# Patient Record
Sex: FEMALE | Race: BLACK OR AFRICAN AMERICAN
[De-identification: names, ages, dates, MRNs, and addresses within clinical notes are randomized per-mention and may not be internally consistent; named-entity substitution may affect disease eponyms.]

---

## 2018-03-10 ENCOUNTER — HOSPITAL ENCOUNTER (INPATIENT)
Dept: HOSPITAL 17 - NEPE | Age: 58
LOS: 3 days | Discharge: HOME | DRG: 558 | End: 2018-03-13
Attending: HOSPITALIST | Admitting: HOSPITALIST
Payer: SELF-PAY

## 2018-03-10 VITALS
DIASTOLIC BLOOD PRESSURE: 72 MMHG | HEART RATE: 84 BPM | SYSTOLIC BLOOD PRESSURE: 131 MMHG | OXYGEN SATURATION: 99 % | TEMPERATURE: 98.1 F

## 2018-03-10 VITALS
HEART RATE: 82 BPM | RESPIRATION RATE: 16 BRPM | SYSTOLIC BLOOD PRESSURE: 138 MMHG | DIASTOLIC BLOOD PRESSURE: 82 MMHG | OXYGEN SATURATION: 98 % | TEMPERATURE: 97.7 F

## 2018-03-10 VITALS
TEMPERATURE: 98 F | SYSTOLIC BLOOD PRESSURE: 117 MMHG | OXYGEN SATURATION: 95 % | RESPIRATION RATE: 16 BRPM | DIASTOLIC BLOOD PRESSURE: 75 MMHG | HEART RATE: 76 BPM

## 2018-03-10 VITALS — HEIGHT: 64 IN | WEIGHT: 171.96 LBS | BODY MASS INDEX: 29.36 KG/M2

## 2018-03-10 VITALS — SYSTOLIC BLOOD PRESSURE: 138 MMHG | DIASTOLIC BLOOD PRESSURE: 78 MMHG

## 2018-03-10 DIAGNOSIS — M16.0: ICD-10-CM
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
## 2018-03-10 DIAGNOSIS — E78.5: ICD-10-CM

## 2018-03-10 DIAGNOSIS — R74.0: ICD-10-CM

## 2018-03-10 DIAGNOSIS — R74.8: ICD-10-CM

## 2018-03-10 DIAGNOSIS — M62.82: Primary | ICD-10-CM

## 2018-03-10 DIAGNOSIS — G89.29: ICD-10-CM

## 2018-03-10 DIAGNOSIS — E11.9: ICD-10-CM

## 2018-03-10 DIAGNOSIS — M54.5: ICD-10-CM

## 2018-03-10 DIAGNOSIS — M62.81: ICD-10-CM

## 2018-03-10 DIAGNOSIS — E55.9: ICD-10-CM

## 2018-03-10 DIAGNOSIS — I10: ICD-10-CM

## 2018-03-10 LAB
ALBUMIN SERPL-MCNC: 3.5 GM/DL (ref 3.4–5)
ALP SERPL-CCNC: 71 U/L (ref 45–117)
ALT SERPL-CCNC: 129 U/L (ref 10–53)
AST SERPL-CCNC: 151 U/L (ref 15–37)
BACTERIA #/AREA URNS HPF: (no result) /HPF
BASOPHILS # BLD AUTO: 0 TH/MM3 (ref 0–0.2)
BASOPHILS NFR BLD: 0.4 % (ref 0–2)
BILIRUB SERPL-MCNC: 0.4 MG/DL (ref 0.2–1)
BUN SERPL-MCNC: 7 MG/DL (ref 7–18)
CALCIUM SERPL-MCNC: 8.9 MG/DL (ref 8.5–10.1)
CHLORIDE SERPL-SCNC: 108 MEQ/L (ref 98–107)
COLOR UR: YELLOW
CREAT SERPL-MCNC: 0.45 MG/DL (ref 0.5–1)
CRP SERPL-MCNC: 0.29 MG/DL (ref 0–0.3)
EOSINOPHIL # BLD: 0 TH/MM3 (ref 0–0.4)
EOSINOPHIL NFR BLD: 0.7 % (ref 0–4)
ERYTHROCYTE [DISTWIDTH] IN BLOOD BY AUTOMATED COUNT: 16.2 % (ref 11.6–17.2)
GFR SERPLBLD BASED ON 1.73 SQ M-ARVRAT: 174 ML/MIN (ref 89–?)
GLUCOSE SERPL-MCNC: 117 MG/DL (ref 74–106)
GLUCOSE UR STRIP-MCNC: (no result) MG/DL
HCO3 BLD-SCNC: 26.6 MEQ/L (ref 21–32)
HCT VFR BLD CALC: 37.5 % (ref 35–46)
HGB BLD-MCNC: 12.5 GM/DL (ref 11.6–15.3)
HGB UR QL STRIP: (no result)
KETONES UR STRIP-MCNC: (no result) MG/DL
LYMPHOCYTES # BLD AUTO: 2 TH/MM3 (ref 1–4.8)
LYMPHOCYTES NFR BLD AUTO: 49.6 % (ref 9–44)
MCH RBC QN AUTO: 31.6 PG (ref 27–34)
MCHC RBC AUTO-ENTMCNC: 33.2 % (ref 32–36)
MCV RBC AUTO: 95.2 FL (ref 80–100)
MONOCYTE #: 0.2 TH/MM3 (ref 0–0.9)
MONOCYTES NFR BLD: 5.5 % (ref 0–8)
MUCOUS THREADS #/AREA URNS LPF: (no result) /LPF
NEUTROPHILS # BLD AUTO: 1.8 TH/MM3 (ref 1.8–7.7)
NEUTROPHILS NFR BLD AUTO: 43.8 % (ref 16–70)
NITRITE UR QL STRIP: (no result)
PLATELET # BLD: 305 TH/MM3 (ref 150–450)
PMV BLD AUTO: 7.6 FL (ref 7–11)
PROT SERPL-MCNC: 7.3 GM/DL (ref 6.4–8.2)
RBC # BLD AUTO: 3.94 MIL/MM3 (ref 4–5.3)
SODIUM SERPL-SCNC: 144 MEQ/L (ref 136–145)
SP GR UR STRIP: 1.02 (ref 1–1.03)
SQUAMOUS #/AREA URNS HPF: 2 /HPF (ref 0–5)
URINE LEUKOCYTE ESTERASE: (no result)
VIT B12 SERPL-MCNC: 326 PG/ML (ref 193–986)
WBC # BLD AUTO: 4.1 TH/MM3 (ref 4–11)

## 2018-03-10 PROCEDURE — 85652 RBC SED RATE AUTOMATED: CPT

## 2018-03-10 PROCEDURE — 80048 BASIC METABOLIC PNL TOTAL CA: CPT

## 2018-03-10 PROCEDURE — 82607 VITAMIN B-12: CPT

## 2018-03-10 PROCEDURE — 72148 MRI LUMBAR SPINE W/O DYE: CPT

## 2018-03-10 PROCEDURE — 84443 ASSAY THYROID STIM HORMONE: CPT

## 2018-03-10 PROCEDURE — 85025 COMPLETE CBC W/AUTO DIFF WBC: CPT

## 2018-03-10 PROCEDURE — 72146 MRI CHEST SPINE W/O DYE: CPT

## 2018-03-10 PROCEDURE — 82306 VITAMIN D 25 HYDROXY: CPT

## 2018-03-10 PROCEDURE — 82550 ASSAY OF CK (CPK): CPT

## 2018-03-10 PROCEDURE — 73130 X-RAY EXAM OF HAND: CPT

## 2018-03-10 PROCEDURE — 82552 ASSAY OF CPK IN BLOOD: CPT

## 2018-03-10 PROCEDURE — 81001 URINALYSIS AUTO W/SCOPE: CPT

## 2018-03-10 PROCEDURE — 73502 X-RAY EXAM HIP UNI 2-3 VIEWS: CPT

## 2018-03-10 PROCEDURE — 86140 C-REACTIVE PROTEIN: CPT

## 2018-03-10 PROCEDURE — 82948 REAGENT STRIP/BLOOD GLUCOSE: CPT

## 2018-03-10 PROCEDURE — 80053 COMPREHEN METABOLIC PANEL: CPT

## 2018-03-10 PROCEDURE — 83735 ASSAY OF MAGNESIUM: CPT

## 2018-03-10 RX ADMIN — HEPARIN SODIUM SCH UNITS: 10000 INJECTION, SOLUTION INTRAVENOUS; SUBCUTANEOUS at 13:34

## 2018-03-10 RX ADMIN — INSULIN ASPART SCH: 100 INJECTION, SOLUTION INTRAVENOUS; SUBCUTANEOUS at 17:00

## 2018-03-10 RX ADMIN — STANDARDIZED SENNA CONCENTRATE AND DOCUSATE SODIUM SCH TAB: 8.6; 5 TABLET, FILM COATED ORAL at 21:10

## 2018-03-10 RX ADMIN — INSULIN ASPART SCH: 100 INJECTION, SOLUTION INTRAVENOUS; SUBCUTANEOUS at 21:00

## 2018-03-10 RX ADMIN — HEPARIN SODIUM SCH MLS/HR: 10000 INJECTION, SOLUTION INTRAVENOUS; SUBCUTANEOUS at 15:23

## 2018-03-10 RX ADMIN — FOLIC ACID SCH MLS/HR: 5 INJECTION, SOLUTION INTRAMUSCULAR; INTRAVENOUS; SUBCUTANEOUS at 21:10

## 2018-03-10 RX ADMIN — Medication SCH ML: at 21:10

## 2018-03-10 NOTE — RADRPT
EXAM DATE/TIME:  03/10/2018 04:58 

 

[facility] COMPARISON:     

No previous studies available for comparison.

 

                     

INDICATIONS :     

Fall. Left hip pain.

                     

 

MEDICAL HISTORY :     

None.          

 

SURGICAL HISTORY :     

None.   

 

ENCOUNTER:     

Initial                                        

 

ACUITY:     

1 day      

 

PAIN SCORE:     

7/10

 

LOCATION:     

Left pelvis 

 

FINDINGS:     

The bony pelvis is intact and has normal morphology. No subluxation or fracture of either hip. There 
is mild bilateral hip osteoarthritis. Mild bilateral subchondral sclerosis. There is subchondral cyst
ic change of the left acetabulum. Radiographic appearance of the soft tissues within normal limits.

 

Moderate degenerative changes of the pubic symphysis.

 

CONCLUSION:     

1. Intact pelvis and left hip.

2. Mild bilateral hip osteoarthritis.

3. Moderate osteoarthritis of the pubic symphysis.

 

 

 

 Andrzej Lowe MD on March 10, 2018 at 5:15           

Board Certified Radiologist.

 This report was verified electronically.

## 2018-03-10 NOTE — HHI.HP
__________________________________________________





HPI


Service


[facility]


Primary Care Physician


Chaz Oh D.O.


Admission Diagnosis





rhabdomyolysis, rule out myositis


Diagnoses:  


Chief Complaint:  


back pain, weakness


Travel History


International Travel<30 Days:  No


Contact w/Intl Traveler <30 Da:  No


Traveled to Known Affected Are:  No


History of Present Illness


Written by Katiuska Harrison, acting as scribe for Dr. Bethea on 3/10/18 at 14:

35. 





58-year-old female with HTN, HLD, DM, presents with 1 year history of worsening 

low back pain, leg weakness, and recent fall. The patient reports over the past 

year she's had low back and hip pains, with loss of strength of the bilateral 

legs. 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

, however lately has not been able to keep up with the work. A month ago she 

picked up a tissue off the floor, and had difficulty standing back up. She saw 

her PCP Dr. Oh as outpatient at that time, had xrays done, and was told she 

has osteoarthritis in bilateral hips. Over the past month she has been having 

trouble getting in and out of a car. She states she has to  her legs 

with her arms in order to transition in and out of the car. The patient states 

last night she went out to dinner with her fiance, had a few cocktails, then 

had a step up however her legs were unable to do so and they just gave out on 

her. She states she fell to ground onto her left hip and left hand. She reports 

continued diffuse low back pain, but mostly complains of bilateral leg 

weakness. She also reports noticing recent numbness down the left lateral leg. 

Denies any recent weight loss or specific joint pains.





Review of Systems


Except as stated in HPI:  all other systems reviewed are Neg





Past Family Social History


Past Medical History


Hyperlipidemia


Hypertension


Diabetes Mellitus


Past Surgical History


Laparoscopic Cholecystectomy





Reported Medications


Unknown, RN to update med rec


Allergies:  


Coded Allergies:  


     No Known Allergies (Unverified , 3/10/18)


Active Ordered Medications





Current Medications








 Medications


  (Trade)  Dose


 Ordered  Sig/Virgilio


 Route  Start Time


 Stop Time Status Last Admin


 


  (D50w (Vial) Inj)  50 ml  UNSCH  PRN


 IV PUSH  3/10/18 13:00


     


 


 


  (Glucagon Inj)  1 mg  UNSCH  PRN


 OTHER  3/10/18 13:00


     


 


 


  (NovoLOG


 SUPPLEMENTAL


 SCALE)  1  ACHS SLIDING  SCALE


 SQ  3/10/18 17:00


     


 


 


 Sodium


 Bicarbonate 100


 meq/Potassium


 Chloride 20 meq/


 Sodium Chloride  1,110 ml @ 


 100 mls/hr  Q11H6M


 IV  3/10/18 14:00


     


 


 


  (NS Flush)  2 ml  UNSCH  PRN


 IV FLUSH  3/10/18 13:00


     


 


 


  (NS Flush)  2 ml  BID


 IV FLUSH  3/10/18 21:00


     


 


 


  (Zofran Inj)  4 mg  Q6H  PRN


 IVP  3/10/18 13:00


     


 


 


  (Heparin Inj)  5,000 units  Q12H


 SQ  3/10/18 13:00


    3/10/18 13:34


 


 


  (Morphine Inj)  1 mg  Q3H  PRN


 IV PUSH  3/10/18 13:00


     


 


 


  (Ultram)  50 mg  Q4H  PRN


 PO  3/10/18 13:00


     


 


 


  (Ultram)  100 mg  Q4H  PRN


 PO  3/10/18 13:00


     


 


 


  (Narcan Inj)  0.4 mg  UNSCH  PRN


 IV PUSH  3/10/18 13:00


     


 


 


  (Jolie-Colace)  1 tab  BID


 PO  3/10/18 21:00


     


 


 


  (Milk Of


 Magnesia Liq)  30 ml  Q12H  PRN


 PO  3/10/18 13:00


     


 


 


  (Senokot)  17.2 mg  Q12H  PRN


 PO  3/10/18 13:00


     


 


 


  (Dulcolax Supp)  10 mg  DAILY  PRN


 RECTAL  3/10/18 13:00


     


 


 


  (Lactulose Liq)  30 ml  DAILY  PRN


 PO  3/10/18 13:00


     


 








Family History


mother with COPD and diabetes


Social History


Drinks alcohol, 1 beer daily, and occasional few cocktails. 


Denies tobacco use or illicit drug use.





Physical Exam


Vital Signs





Vital Signs








  Date Time  Temp Pulse Resp B/P (MAP) Pulse Ox O2 Delivery O2 Flow Rate FiO2


 


3/10/18 14:18        


 


3/10/18 12:54   18     


 


3/10/18 12:09    138/78 (98)    


 


3/10/18 07:14  83 18  99 Room Air  


 


3/10/18 01:51 98.1 84  131/72 (91) 99   








Physical Exam


GENERAL: Well-nourished, well-developed middle aged female patient in NAD.


SKIN: Warm and dry. No rash.


HEAD:  Normocephalic. Atraumatic.


EYES: Pupils equal and round. No scleral icterus. No injection or drainage. 


ENT: No nasal bleeding or discharge.  Mucous membranes pink and moist.


NECK: Supple. Trachea midline.  


CARDIOVASCULAR: Regular rate and rhythm.  S1, S2 noted. No murmur appreciated. 


RESPIRATORY: No accessory muscle use. Clear to auscultation. Breath sounds 

equal bilaterally.  


GASTROINTESTINAL: Abdomen soft, non-tender, nondistended. Normoactive bowel 

sounds x4.


MUSCULOSKELETAL: No obvious deformities. Extremities without clubbing, cyanosis

, or edema. C-T-L spine nontender to palpation, no bony point or paraspinous 

muscle tenderness. 


NEUROLOGICAL: Awake and alert. No obvious cranial nerve deficits.  Motor 

grossly within normal limits. 5/5 muscle strength in bilateral upper and lower 

extremities, however with generalized weakness, difficulty sitting herself up 

in bed.  Normal speech.


PSYCHIATRIC: Appropriate mood and affect; insight and judgment normal.


Laboratory





Laboratory Tests








Test


  3/10/18


08:55 3/10/18


09:00


 


White Blood Count 4.1  


 


Red Blood Count 3.94  


 


Hemoglobin 12.5  


 


Hematocrit 37.5  


 


Mean Corpuscular Volume 95.2  


 


Mean Corpuscular Hemoglobin 31.6  


 


Mean Corpuscular Hemoglobin


Concent 33.2 


  


 


 


Red Cell Distribution Width 16.2  


 


Platelet Count 305  


 


Mean Platelet Volume 7.6  


 


Neutrophils (%) (Auto) 43.8  


 


Lymphocytes (%) (Auto) 49.6  


 


Monocytes (%) (Auto) 5.5  


 


Eosinophils (%) (Auto) 0.7  


 


Basophils (%) (Auto) 0.4  


 


Neutrophils # (Auto) 1.8  


 


Lymphocytes # (Auto) 2.0  


 


Monocytes # (Auto) 0.2  


 


Eosinophils # (Auto) 0.0  


 


Basophils # (Auto) 0.0  


 


CBC Comment DIFF FINAL  


 


Differential Comment   


 


Blood Urea Nitrogen 7  


 


Creatinine 0.45  


 


Random Glucose 117  


 


Total Protein 7.3  


 


Albumin 3.5  


 


Calcium Level 8.9  


 


Alkaline Phosphatase 71  


 


Aspartate Amino Transf


(AST/SGOT) 151 


  


 


 


Alanine Aminotransferase


(ALT/SGPT) 129 


  


 


 


Total Bilirubin 0.4  


 


Sodium Level 144  


 


Potassium Level 3.5  


 


Chloride Level 108  


 


Carbon Dioxide Level 26.6  


 


Anion Gap 9  


 


Estimat Glomerular Filtration


Rate 174 


  


 


 


Total Creatine Kinase 92541  


 


Creatine Kinase .2  


 


Creatine Kinase MB % 3.6  


 


Urine Color  YELLOW 


 


Urine Turbidity  CLEAR 


 


Urine pH  5.5 


 


Urine Specific Gravity  1.021 


 


Urine Protein  NEG 


 


Urine Glucose (UA)  NEG 


 


Urine Ketones  NEG 


 


Urine Occult Blood  NEG 


 


Urine Nitrite  NEG 


 


Urine Bilirubin  NEG 


 


Urine Urobilinogen  LESS THAN 2.0 


 


Urine Leukocyte Esterase  NEG 


 


Urine RBC  LESS THAN 1 


 


Urine WBC  2 


 


Urine Squamous Epithelial


Cells 


  2 


 


 


Urine Bacteria  OCC 


 


Urine Mucus  MOD 


 


Microscopic Urinalysis Comment


  


  CULT NOT


INDICATED








Result Diagram:  


3/10/18 0855                                                                   

             3/10/18 0855





Imaging





Last Impressions








Thoracic Spine MRI 3/10/18 0524 Signed





Impressions: 





 Service Date/Time:  Saturday, March 10, 2018 06:18 - CONCLUSION:  1. Mild 





 degenerative disease T6-T7 2. No evidence of significant disc protrusion, 

spinal 





 stenosis, bony abnormality or spinal cord abnormalities.     Paul Valdivia MD 


 


Lumbar Spine MRI 3/10/18 0524 Signed





Impressions: 





 Service Date/Time:  Saturday, March 10, 2018 06:18 - CONCLUSION:  Normal 





 examination.       Paul Valdivia MD 


 


Hip and Pelvis X-Ray 3/10/18 0440 Signed





Impressions: 





 Service Date/Time:  Saturday, March 10, 2018 04:58 - CONCLUSION:  1. Intact 





 pelvis and left hip. 2. Mild bilateral hip osteoarthritis. 3. Moderate 





 osteoarthritis of the pubic symphysis.     Andrzej Lowe MD 


 


Hand X-Ray 3/10/18 0440 Signed





Impressions: 





 Service Date/Time:  Saturday, March 10, 2018 05:01 - CONCLUSION: Intact left 





 hand.     Andrzej Lowe MD 











Capcolbyi VTE Risk Assessment


Caprini VTE Risk Assessment:  No/Low Risk (score <= 1)


Caprini Risk Assessment Model











 Point Value = 1          Point Value = 2  Point Value = 3  Point Value = 5


 


Age 41-60


Minor surgery


BMI > 25 kg/m2


Swollen legs


Varicose veins


Pregnancy or postpartum


History of unexplained or recurrent


   spontaneous 


Oral contraceptives or hormone


   replacement


Sepsis (< 1 month)


Serious lung disease, including


   pneumonia (< 1 month)


Abnormal pulmonary function


Acute myocardial infarction


Congestive heart failure (< 1 month)


History of inflammatory bowel disease


Medical patient at bed rest Age 61-74


Arthroscopic surgery


Major open surgery (> 45 min)


Laparoscopic surgery (> 45 min)


Malignancy


Confined to bed (> 72 hours)


Immobilizing plaster cast


Central venous access Age >= 75


History of VTE


Family history of VTE


Factor V Leiden


Prothrombin 33443M


Lupus anticoagulant


Anticardiolipin antibodies


Elevated serum homocysteine


Heparin-induced thrombocytopenia


Other congenital or acquired


   thrombophilia Stroke (< 1 month)


Elective arthroplasty


Hip, pelvis, or leg fracture


Acute spinal cord injury (< 1 month)








Prophylaxis Regimen











   Total Risk


Factor Score Risk Level Prophylaxis Regimen


 


0-1      Low Early ambulation


 


2 Moderate Order ONE of the following:


*Sequential Compression Device (SCD)


*Heparin 5000 units SQ BID


 


3-4 Higher Order ONE of the following medications:


*Heparin 5000 units SQ TID


*Enoxaparin/Lovenox 40 mg SQ daily (WT < 150 kg, CrCl > 30 mL/min)


*Enoxaparin/Lovenox 30 mg SQ daily (WT < 150 kg, CrCl > 10-29 mL/min)


*Enoxaparin/Lovenox 30 mg SQ BID (WT < 150 kg, CrCl > 30 mL/min)


AND/OR


*Sequential Compression Device (SCD)


 


5 or more Highest Order ONE of the following medications:


*Heparin 5000 units SQ TID (Preferred with Epidurals)


*Enoxaparin/Lovenox 40 mg SQ daily (WT < 150 kg, CrCl > 30 mL/min)


*Enoxaparin/Lovenox 30 mg SQ daily (WT < 150 kg, CrCl > 10-29 mL/min)


*Enoxaparin/Lovenox 30 mg SQ BID (WT < 150 kg, CrCl > 30 mL/min)


AND


*Sequential Compression Device (SCD)











Assessment and Plan


Problem List:  


(1) Rhabdomyolysis


ICD Code:  M62.82 - Rhabdomyolysis


Status:  Acute


(2) Fall


ICD Code:  W19.XXXA - Unspecified fall, initial encounter


Status:  Acute


Assessment and Plan


58-year-old female with HTN, HLD, DM, presents with 1 year history of worsening 

low back pain, leg weakness, and recent fall. 





Bilateral proximal lower Extremity Weakness and Fall: unclear etiology, rule 

out myositis.  


   -T-spine MRI reviewed, shows mild degenerative disease T6-7; no other acute 

abnormalities


   -L-spine MRI reviewed, normal


   -Hip/Pelvis xray reviewed, show mild bilateral osteoarthritis and moderate 

osteoarthritis of pubic symphysis


   -Check ESR, CRP, TSH, B12, Vitamin D.  Consider neurology consult


   -Consult PT


   -Fall precautions





Rhabdomyolysis: CPK 22187. Suspect secondary to recent fall


   -Continue IVF with NS at 100cc/hr


   -Monitor CPK and renal function





Transaminitis: patient with elevated LFTs, no previous labs to compare. Suspect 

secondary to patient's daily alcohol use and rhabdomyolysis. 


   -avoid hepatotoxins


   -repeat LFTs


   -counseled on alcohol cessation


   -outpatient f/up





Hyperlipidemia/Hypertension: BP currently well controlled.


   -unclear if patient takes meds at home, RN to update med rec





Diabetes Mellitus: 


   -Monitor accu-checks and cover with SSI





DVT Prophylaxis: Heparin sq


Discussed Condition With


Patient, RN, Dr. Crockett





This note was transcribed by staci Harrison.  I, Dr. Tom Bethea 

personally performed the history, physical exam, and medical decision making; 

and confirmed the accuracy of the information in the transcribed note.





Authenticated by Dr. Tom Bethea on 3/10/18 at 14:45.





Problem Qualifiers





(1) Rhabdomyolysis:  


Qualified Codes:  T79.6XXA - Traumatic ischemia of muscle, initial encounter


(2) Fall:  


Qualified Codes:  W19.XXXA - Unspecified fall, initial encounter








Katiuska Harrison PA-C Mar 10, 2018 14:37


Tom Bethea MD Mar 10, 2018 14:45

## 2018-03-10 NOTE — RADRPT
EXAM DATE/TIME:  03/10/2018 06:18 

 

[facility] COMPARISON:     

No previous studies available for comparison.

       

 

 

INDICATIONS :     

Weakness. Back pain. Left sided weakness.

                     

 

MEDICAL HISTORY :     

Diabetes mellitus type 2.     

 

SURGICAL HISTORY :     

Cholecystectomy.     

 

ENCOUNTER:     

Initial

 

ACUITY:     

> 1 year

 

PAIN SCORE:     

8/10

 

LOCATION:       

Paraspinal 

 

TECHNIQUE:     

Multiplanar multisequence MRI of the thoracic spine was performed.

 

FINDINGS:     

 

VERTEBRA:     

Normal vertebral body height.  Homogeneous marrow signal.

 

ALIGNMENT:     

Normal.

 

CORD:     

Normal position and configuration.

 

T1-T2:     

Normal.

 

T2-T3:     

The thecal sac has a normal diameter.  No evidence of disc bulge or protrusion.

 

T3-T4:     

The thecal sac has a normal diameter.  No evidence of disc bulge or protrusion.

 

T4-T5:     

The thecal sac has a normal diameter.  No evidence of disc bulge or protrusion.

 

T5-T6:     

The thecal sac has a normal diameter.  No evidence of disc bulge or protrusion.

 

T6-T7:     

Mild degenerative disc disease. Small left paracentral disc osteophyte complex without significant ma
ss effect is noted.

 

T7-T8:     

The thecal sac has a normal diameter.  No evidence of disc bulge or protrusion.

 

T8-T9:     

The thecal sac has a normal diameter.  No evidence of disc bulge or protrusion.

 

T9-T10:   

The thecal sac has a normal diameter.  No evidence of disc bulge or protrusion.

 

T10-T11: 

The thecal sac has a normal diameter.  No evidence of disc bulge or protrusion.

 

T11-T12: 

The thecal sac has a normal diameter.  No evidence of disc bulge or protrusion.

 

T12-L1:   

The thecal sac has a normal diameter.  No evidence of disc bulge or protrusion.

 

CONCLUSION:     

1. Mild degenerative disease T6-T7

2. No evidence of significant disc protrusion, spinal stenosis, bony abnormality or spinal cord abnor
malities.

 

 

 

 Paul Valdivia MD on March 10, 2018 at 7:14           

Board Certified Radiologist.

 This report was verified electronically.

## 2018-03-10 NOTE — PD
HPI


Chief Complaint:  Fall


Time Seen by Provider:  04:40


Travel History


International Travel<30 days:  No


Contact w/Intl Traveler<30days:  No


Traveled to known affect area:  No





History of Present Illness


HPI


58-year-old female patient presents to the ER today, states that she has had a 

1 year history of lower back pains, told that she has arthritis in her hips, 

and states that she has difficulty getting up when she bends down to get 

something.  She states that she bent down today to  something on the 

floor and she fell onto her left hip and hand, complaining of left hip and hand 

pain.  She denies any incontinence, fevers, or other issues.  She states that 

it has been getting so bad that she needs help to get in and out of cars, and 

to get up when she bends down.  She denies any head injury or other injuries.  

She states that she felt like her legs gave out on her, states that she has 

been getting weaker and weaker.





Modifying Factors: None


Associated Signs & Symptoms: Acute on chronic back pain, legs gave out, left 

hip injury and have head injury


Risk Factors: Chronic back pain





PFSH


Past Medical History


High Cholesterol:  Yes


Diabetes:  Yes


Patient Takes Glucophage:  Yes


Diminished Hearing:  No


Hypertension:  Yes





Social History


Alcohol Use:  Yes (WEEKLY )


Tobacco Use:  No


Substance Use:  No





Allergies-Medications


(Allergen,Severity, Reaction):  


Coded Allergies:  


     No Known Allergies (Unverified , 3/10/18)





Review of Systems


Except as stated in HPI:  all other systems reviewed are Neg





Physical Exam


Narrative


GENERAL: Well-developed middle-aged -American female patient currently 

in mild distress.  Awake and oriented 3.


SKIN: Focused skin assessment warm/dry.


HEAD: Atraumatic. Normocephalic. 


EYES: Pupils equal and round. No scleral icterus. No injection or drainage. 


ENT: No nasal bleeding or discharge.  Mucous membranes pink and moist.


NECK: Trachea midline. No JVD.  Supple.


CARDIOVASCULAR: Regular rate and rhythm.  No murmur appreciated.


RESPIRATORY: No accessory muscle use. Clear to auscultation. Breath sounds 

equal bilaterally. 


GASTROINTESTINAL: Abdomen soft, non-tender, nondistended. Hepatic and splenic 

margins not palpable. 


Pelvis: Stable, mildly tender to palpation in the left hip area.


MUSCULOSKELETAL: No obvious deformities. No clubbing.  No cyanosis.  No edema. 


EXTREMITIES: No clubbing, cyanosis, or edema. No joint tenderness, effusion, or 

edema noted.  Mildly tender to palpation of the left hand dorsum without 

obvious contusion or lacerations.  No bony tenderness or deformities..


NEUROLOGICAL: Awake and alert. No obvious cranial nerve deficits.  Motor 

grossly within normal limits. Normal speech.


PSYCHIATRIC: Appropriate mood and affect; insight and judgment normal.





Data


Data


Last Documented VS





Vital Signs








  Date Time  Temp Pulse Resp B/P (MAP) Pulse Ox O2 Delivery O2 Flow Rate FiO2


 


3/10/18 01:51 98.1 84  131/72 (91) 99   








Orders





 Orders


Hand, Complete (Sud8xlw) (3/10/18 04:40)


Hip, Uni(Ap&Lat) W Ap Pelvis (3/10/18 04:40)


Mri T Spine W/O Contrast (3/10/18 05:24)


Mri L Spine W/O Contrast (3/10/18 05:24)


Tramadol (Ultram) (3/10/18 05:30)








MDM


Medical Decision Making


Medical Screen Exam Complete:  Yes


Emergency Medical Condition:  Yes


Medical Record Reviewed:  Yes


Interpretation(s)





Last 24 hours Impressions








Hip and Pelvis X-Ray 3/10/18 0440 Signed





Impressions: 





 Service Date/Time:  Saturday, March 10, 2018 04:58 - CONCLUSION:  1. Intact 





 pelvis and left hip. 2. Mild bilateral hip osteoarthritis. 3. Moderate 





 osteoarthritis of the pubic symphysis.     Andrzej Lowe MD 


 


Hand X-Ray 3/10/18 0440 Signed





Impressions: 





 Service Date/Time:  Saturday, March 10, 2018 05:01 - CONCLUSION: Intact left 





 hand.     Andrzej Lowe MD 








Differential Diagnosis


Contusions versus fractures


Narrative Course


X-rays not show any signs of acute injuries.  MRI was ordered due to her 

weakness.





Physician Communication


Physician Communication


Case is signed out to Dr. Crockett at 7 AM pending MRI.  If unremarkable for 

acute issues, can be released with follow-up to primary care doctor.





Diagnosis





 Primary Impression:  


 Fall


 Additional Impressions:  


 Injury of left hip


 Acute exacerbation of chronic low back pain


Condition:  Stable











Roscoe Quinonez MD Mar 10, 2018 05:04

## 2018-03-10 NOTE — RADRPT
EXAM DATE/TIME:  03/10/2018 06:18 

 

[facility] COMPARISON:     

No previous studies available for comparison.

       

 

 

INDICATIONS :     

Weakness. Back pain. Left sided weakness.

                     

 

MEDICAL HISTORY :     

Diabetes mellitus type 2.     

 

SURGICAL HISTORY :     

Cholecystectomy.     

 

ENCOUNTER:     

Initial

 

ACUITY:     

> 1 year

 

PAIN SCORE:     

8/10

 

LOCATION:       

Paraspinal 

 

TECHNIQUE:     

Multiplanar multisequence MRI of the lumbar spine was performed without contrast.

 

FINDINGS:     

The most caudal appearing lumbar vertebra is numbered as L5.

 

VERTEBRAE:     

Homogeneous signal.  Normal alignment.

 

CONUS:     

Normal level and configuration.

 

T12-L1:  

The thecal sac has a normal diameter.  No evidence of disc bulge or protrusion.  The neural foramina 
are patent bilaterally.

 

L1-L2:  

The thecal sac has a normal diameter.  No evidence of disc bulge or protrusion.  The neural foramina 
are patent bilaterally.

 

L2-L3:  

The thecal sac has a normal diameter.  No evidence of disc bulge or protrusion.  The neural foramina 
are patent bilaterally.

 

L3-L4:  

The thecal sac has a normal diameter.  No evidence of disc bulge or protrusion.  The neural foramina 
are patent bilaterally.

 

L4-L5:  

The thecal sac has a normal diameter.  No evidence of disc bulge or protrusion.  The neural foramina 
are patent bilaterally.

 

L5-S1:  

The thecal sac has a normal diameter.  No evidence of disc bulge or protrusion.  The neural foramina 
are patent bilaterally.

 

CONCLUSION:     

Normal examination.  

 

 

 

 Paul Valdivia MD on March 10, 2018 at 7:18           

Board Certified Radiologist.

 This report was verified electronically.

## 2018-03-10 NOTE — RADRPT
EXAM DATE/TIME:  03/10/2018 05:01 

 

[facility] COMPARISON:     

No previous studies available for comparison.

 

                     

INDICATIONS :     

Fall. Left hand pain.

                     

 

MEDICAL HISTORY :     

None.          

 

SURGICAL HISTORY :     

None.   

 

ENCOUNTER:     

Initial                                        

 

ACUITY:     

1 day      

 

PAIN SCORE:     

6/10

 

LOCATION:     

Left upper extremity 

 

FINDINGS:     

Three view examination of the left hand demonstrates no soft tissue swelling, dislocation, or fractur
e.   The carpal bones appear intact.  The interphalangeal and metacarpophalangeal joints are intact. 
 Bony mineralization is normal.

 

CONCLUSION:     Intact left hand.

 

 

 

 Andrzej Lowe MD on March 10, 2018 at 5:17           

Board Certified Radiologist.

 This report was verified electronically.

## 2018-03-10 NOTE — PD
Physical Exam


Date Seen by Provider:  Mar 10, 2018


Time Seen by Provider:  07:06


Narrative


The patient is a 58 year-old female who was initially evaluated by the previous 

physician.  Please refer to the initial history, physical, diagnostic evaluation

, treatment modality plan.  The patient was signed out at 7 AM with MRI of the T

-spine and L-spine pending.





Data


Data


Last Documented VS





Vital Signs








  Date Time  Temp Pulse Resp B/P (MAP) Pulse Ox O2 Delivery O2 Flow Rate FiO2


 


3/10/18 12:54   18     


 


3/10/18 12:09    138/78 (98)    


 


3/10/18 07:14  83   99 Room Air  


 


3/10/18 01:51 98.1       








Orders





 Orders


Hand, Complete (Cnq0hat) (3/10/18 04:40)


Hip, Uni(Ap&Lat) W Ap Pelvis (3/10/18 04:40)


Mri T Spine W/O Contrast (3/10/18 05:24)


Mri L Spine W/O Contrast (3/10/18 05:24)


Tramadol (Ultram) (3/10/18 05:30)


Complete Blood Count With Diff (3/10/18 07:05)


Comprehensive Metabolic Panel (3/10/18 07:05)


Urinalysis - C+S If Indicated (3/10/18 07:05)


Creatine Kinase (Cpk) (3/10/18 07:05)


CKMB (3/10/18 08:55)


CKMB% (3/10/18 08:55)


Sodium Chlor 0.9% 1000 Ml Inj (Ns 1000 M (3/10/18 13:00)





Labs





Laboratory Tests








Test


  3/10/18


08:55 3/10/18


09:00


 


White Blood Count 4.1 TH/MM3  


 


Red Blood Count 3.94 MIL/MM3  


 


Hemoglobin 12.5 GM/DL  


 


Hematocrit 37.5 %  


 


Mean Corpuscular Volume 95.2 FL  


 


Mean Corpuscular Hemoglobin 31.6 PG  


 


Mean Corpuscular Hemoglobin


Concent 33.2 % 


  


 


 


Red Cell Distribution Width 16.2 %  


 


Platelet Count 305 TH/MM3  


 


Mean Platelet Volume 7.6 FL  


 


Neutrophils (%) (Auto) 43.8 %  


 


Lymphocytes (%) (Auto) 49.6 %  


 


Monocytes (%) (Auto) 5.5 %  


 


Eosinophils (%) (Auto) 0.7 %  


 


Basophils (%) (Auto) 0.4 %  


 


Neutrophils # (Auto) 1.8 TH/MM3  


 


Lymphocytes # (Auto) 2.0 TH/MM3  


 


Monocytes # (Auto) 0.2 TH/MM3  


 


Eosinophils # (Auto) 0.0 TH/MM3  


 


Basophils # (Auto) 0.0 TH/MM3  


 


CBC Comment DIFF FINAL  


 


Differential Comment   


 


Blood Urea Nitrogen 7 MG/DL  


 


Creatinine 0.45 MG/DL  


 


Random Glucose 117 MG/DL  


 


Total Protein 7.3 GM/DL  


 


Albumin 3.5 GM/DL  


 


Calcium Level 8.9 MG/DL  


 


Alkaline Phosphatase 71 U/L  


 


Aspartate Amino Transf


(AST/SGOT) 151 U/L 


  


 


 


Alanine Aminotransferase


(ALT/SGPT) 129 U/L 


  


 


 


Total Bilirubin 0.4 MG/DL  


 


Sodium Level 144 MEQ/L  


 


Potassium Level 3.5 MEQ/L  


 


Chloride Level 108 MEQ/L  


 


Carbon Dioxide Level 26.6 MEQ/L  


 


Anion Gap 9 MEQ/L  


 


Estimat Glomerular Filtration


Rate 174 ML/MIN 


  


 


 


Total Creatine Kinase 04943 U/L  


 


Creatine Kinase .2 NG/ML  


 


Creatine Kinase MB % 3.6 %  


 


Urine Color  YELLOW 


 


Urine Turbidity  CLEAR 


 


Urine pH  5.5 


 


Urine Specific Gravity  1.021 


 


Urine Protein  NEG mg/dL 


 


Urine Glucose (UA)  NEG mg/dL 


 


Urine Ketones  NEG mg/dL 


 


Urine Occult Blood  NEG 


 


Urine Nitrite  NEG 


 


Urine Bilirubin  NEG 


 


Urine Urobilinogen


  


  LESS THAN 2.0


MG/DL


 


Urine Leukocyte Esterase  NEG 


 


Urine RBC


  


  LESS THAN 1


/hpf


 


Urine WBC  2 /hpf 


 


Urine Squamous Epithelial


Cells 


  2 /hpf 


 


 


Urine Bacteria  OCC /hpf 


 


Urine Mucus  MOD /lpf 


 


Microscopic Urinalysis Comment


  


  CULT NOT


INDICATED











MDM


Medical Record Reviewed:  Yes


Supervised Visit with FAISAL:  No


Interpretation(s)





Laboratory Tests








Test


  3/10/18


08:55 3/10/18


09:00


 


White Blood Count 4.1 TH/MM3  


 


Red Blood Count 3.94 MIL/MM3  


 


Hemoglobin 12.5 GM/DL  


 


Hematocrit 37.5 %  


 


Mean Corpuscular Volume 95.2 FL  


 


Mean Corpuscular Hemoglobin 31.6 PG  


 


Mean Corpuscular Hemoglobin


Concent 33.2 % 


  


 


 


Red Cell Distribution Width 16.2 %  


 


Platelet Count 305 TH/MM3  


 


Mean Platelet Volume 7.6 FL  


 


Neutrophils (%) (Auto) 43.8 %  


 


Lymphocytes (%) (Auto) 49.6 %  


 


Monocytes (%) (Auto) 5.5 %  


 


Eosinophils (%) (Auto) 0.7 %  


 


Basophils (%) (Auto) 0.4 %  


 


Neutrophils # (Auto) 1.8 TH/MM3  


 


Lymphocytes # (Auto) 2.0 TH/MM3  


 


Monocytes # (Auto) 0.2 TH/MM3  


 


Eosinophils # (Auto) 0.0 TH/MM3  


 


Basophils # (Auto) 0.0 TH/MM3  


 


CBC Comment DIFF FINAL  


 


Differential Comment   


 


Blood Urea Nitrogen 7 MG/DL  


 


Creatinine 0.45 MG/DL  


 


Random Glucose 117 MG/DL  


 


Total Protein 7.3 GM/DL  


 


Albumin 3.5 GM/DL  


 


Calcium Level 8.9 MG/DL  


 


Alkaline Phosphatase 71 U/L  


 


Aspartate Amino Transf


(AST/SGOT) 151 U/L 


  


 


 


Alanine Aminotransferase


(ALT/SGPT) 129 U/L 


  


 


 


Total Bilirubin 0.4 MG/DL  


 


Sodium Level 144 MEQ/L  


 


Potassium Level 3.5 MEQ/L  


 


Chloride Level 108 MEQ/L  


 


Carbon Dioxide Level 26.6 MEQ/L  


 


Anion Gap 9 MEQ/L  


 


Estimat Glomerular Filtration


Rate 174 ML/MIN 


  


 


 


Total Creatine Kinase 18561 U/L  


 


Creatine Kinase .2 NG/ML  


 


Creatine Kinase MB % 3.6 %  


 


Urine Color  YELLOW 


 


Urine Turbidity  CLEAR 


 


Urine pH  5.5 


 


Urine Specific Gravity  1.021 


 


Urine Protein  NEG mg/dL 


 


Urine Glucose (UA)  NEG mg/dL 


 


Urine Ketones  NEG mg/dL 


 


Urine Occult Blood  NEG 


 


Urine Nitrite  NEG 


 


Urine Bilirubin  NEG 


 


Urine Urobilinogen


  


  LESS THAN 2.0


MG/DL


 


Urine Leukocyte Esterase  NEG 


 


Urine RBC


  


  LESS THAN 1


/hpf


 


Urine WBC  2 /hpf 


 


Urine Squamous Epithelial


Cells 


  2 /hpf 


 


 


Urine Bacteria  OCC /hpf 


 


Urine Mucus  MOD /lpf 


 


Microscopic Urinalysis Comment


  


  CULT NOT


INDICATED








Last Impressions








Thoracic Spine MRI 3/10/18 0524 Signed





Impressions: 





 Service Date/Time:  Saturday, March 10, 2018 06:18 - CONCLUSION:  1. Mild 





 degenerative disease T6-T7 2. No evidence of significant disc protrusion, 

spinal 





 stenosis, bony abnormality or spinal cord abnormalities.     Paul Valdivia MD 


 


Lumbar Spine MRI 3/10/18 0524 Signed





Impressions: 





 Service Date/Time:  Saturday, March 10, 2018 06:18 - CONCLUSION:  Normal 





 examination.       Paul Valdivia MD 


 


Hip and Pelvis X-Ray 3/10/18 0440 Signed





Impressions: 





 Service Date/Time:  Saturday, March 10, 2018 04:58 - CONCLUSION:  1. Intact 





 pelvis and left hip. 2. Mild bilateral hip osteoarthritis. 3. Moderate 





 osteoarthritis of the pubic symphysis.     Andrzej Lowe MD 


 


Hand X-Ray 3/10/18 0440 Signed





Impressions: 





 Service Date/Time:  Saturday, March 10, 2018 05:01 - CONCLUSION: Intact left 





 hand.     Andrzej Lowe MD 








Differential Diagnosis


Differential diagnosis includes cauda equina, herniated disc, chronic back pain

, disability, UTI, hyponatremia, acute renal failure.


Narrative Course


The patient is a 58 year-old female was initially evaluated by the previous 

physician.  Please refer to the initial history, physical, diagnostic evaluation

, treatment modality plan.  The patient was signed out at 7 AM with MRI of the 

lumbar spine and thoracic spine pending.  The patient's x-rays and MRIs were 

reviewed, no significant changes except for degenerative changes.  However, she 

has pain and weakness of the hands bilaterally which she attributed arthritis.  

However, she has been having increasing symptoms.  Therefore, laboratory 

evaluation was performed, the patient had an elevated CPK of greater than 11,

000.  Unsure if this is related to polymyositis/myositis versus polymyalgia 

rheumatica with her weakness.  Patient may need sedimentation rate and CRP 

obtained as well as rheumatology evaluation.  Patient was administered 1 L of 

IV fluids.  The patient will be admitted.


Physician Communication


Physician Communication


I discussed the patient with Dr. Bethea who agrees with admission.





Diagnosis





 Primary Impression:  


 Fall


 Qualified Codes:  W19.XXXA - Unspecified fall, initial encounter


 Additional Impressions:  


 Injury of left hip


 Qualified Codes:  S79.912A - Unspecified injury of left hip, initial encounter


 Acute exacerbation of chronic low back pain


 Rhabdomyolysis


 Qualified Codes:  T79.6XXA - Traumatic ischemia of muscle, initial encounter





Admitting Information


Admitting Physician Requests:  Admit


Condition:  Stable











Prosper Crockett MD Mar 10, 2018 07:07

## 2018-03-11 VITALS
HEART RATE: 74 BPM | SYSTOLIC BLOOD PRESSURE: 118 MMHG | TEMPERATURE: 97.5 F | DIASTOLIC BLOOD PRESSURE: 71 MMHG | OXYGEN SATURATION: 97 % | RESPIRATION RATE: 16 BRPM

## 2018-03-11 VITALS
TEMPERATURE: 98 F | OXYGEN SATURATION: 97 % | RESPIRATION RATE: 20 BRPM | DIASTOLIC BLOOD PRESSURE: 77 MMHG | HEART RATE: 78 BPM | SYSTOLIC BLOOD PRESSURE: 112 MMHG

## 2018-03-11 VITALS
RESPIRATION RATE: 18 BRPM | OXYGEN SATURATION: 99 % | TEMPERATURE: 99.2 F | HEART RATE: 86 BPM | SYSTOLIC BLOOD PRESSURE: 113 MMHG | DIASTOLIC BLOOD PRESSURE: 67 MMHG

## 2018-03-11 VITALS
SYSTOLIC BLOOD PRESSURE: 119 MMHG | TEMPERATURE: 96.8 F | DIASTOLIC BLOOD PRESSURE: 79 MMHG | OXYGEN SATURATION: 95 % | HEART RATE: 73 BPM | RESPIRATION RATE: 18 BRPM

## 2018-03-11 VITALS
SYSTOLIC BLOOD PRESSURE: 123 MMHG | TEMPERATURE: 98.6 F | HEART RATE: 86 BPM | RESPIRATION RATE: 20 BRPM | OXYGEN SATURATION: 100 % | DIASTOLIC BLOOD PRESSURE: 76 MMHG

## 2018-03-11 VITALS
TEMPERATURE: 98 F | DIASTOLIC BLOOD PRESSURE: 70 MMHG | RESPIRATION RATE: 20 BRPM | OXYGEN SATURATION: 97 % | HEART RATE: 75 BPM | SYSTOLIC BLOOD PRESSURE: 110 MMHG

## 2018-03-11 VITALS — HEART RATE: 75 BPM

## 2018-03-11 VITALS — OXYGEN SATURATION: 95 %

## 2018-03-11 LAB
ALBUMIN SERPL-MCNC: 2.9 GM/DL (ref 3.4–5)
ALP SERPL-CCNC: 59 U/L (ref 45–117)
ALT SERPL-CCNC: 100 U/L (ref 10–53)
AST SERPL-CCNC: 122 U/L (ref 15–37)
BILIRUB SERPL-MCNC: 0.4 MG/DL (ref 0.2–1)
BUN SERPL-MCNC: 6 MG/DL (ref 7–18)
CALCIUM SERPL-MCNC: 8.3 MG/DL (ref 8.5–10.1)
CHLORIDE SERPL-SCNC: 106 MEQ/L (ref 98–107)
CREAT SERPL-MCNC: 0.33 MG/DL (ref 0.5–1)
GFR SERPLBLD BASED ON 1.73 SQ M-ARVRAT: 249 ML/MIN (ref 89–?)
GLUCOSE SERPL-MCNC: 112 MG/DL (ref 74–106)
HCO3 BLD-SCNC: 27.6 MEQ/L (ref 21–32)
PROT SERPL-MCNC: 6.1 GM/DL (ref 6.4–8.2)
SODIUM SERPL-SCNC: 142 MEQ/L (ref 136–145)

## 2018-03-11 RX ADMIN — VITAMIN D, TAB 1000IU (100/BT) SCH UNITS: 25 TAB at 09:24

## 2018-03-11 RX ADMIN — INSULIN ASPART SCH: 100 INJECTION, SOLUTION INTRAVENOUS; SUBCUTANEOUS at 08:00

## 2018-03-11 RX ADMIN — STANDARDIZED SENNA CONCENTRATE AND DOCUSATE SODIUM SCH TAB: 8.6; 5 TABLET, FILM COATED ORAL at 09:25

## 2018-03-11 RX ADMIN — MULTIPLE VITAMINS W/ MINERALS TAB SCH TAB: TAB at 09:24

## 2018-03-11 RX ADMIN — INSULIN ASPART SCH: 100 INJECTION, SOLUTION INTRAVENOUS; SUBCUTANEOUS at 12:00

## 2018-03-11 RX ADMIN — INSULIN ASPART SCH: 100 INJECTION, SOLUTION INTRAVENOUS; SUBCUTANEOUS at 17:11

## 2018-03-11 RX ADMIN — FOLIC ACID SCH MLS/HR: 5 INJECTION, SOLUTION INTRAMUSCULAR; INTRAVENOUS; SUBCUTANEOUS at 10:00

## 2018-03-11 RX ADMIN — STANDARDIZED SENNA CONCENTRATE AND DOCUSATE SODIUM SCH TAB: 8.6; 5 TABLET, FILM COATED ORAL at 21:00

## 2018-03-11 RX ADMIN — FOLIC ACID SCH MLS/HR: 5 INJECTION, SOLUTION INTRAMUSCULAR; INTRAVENOUS; SUBCUTANEOUS at 12:39

## 2018-03-11 RX ADMIN — FOLIC ACID SCH MG: 1 TABLET ORAL at 09:24

## 2018-03-11 RX ADMIN — FOLIC ACID SCH MLS/HR: 5 INJECTION, SOLUTION INTRAMUSCULAR; INTRAVENOUS; SUBCUTANEOUS at 04:52

## 2018-03-11 RX ADMIN — HEPARIN SODIUM SCH UNITS: 10000 INJECTION, SOLUTION INTRAVENOUS; SUBCUTANEOUS at 00:58

## 2018-03-11 RX ADMIN — HEPARIN SODIUM SCH UNITS: 10000 INJECTION, SOLUTION INTRAVENOUS; SUBCUTANEOUS at 17:02

## 2018-03-11 RX ADMIN — Medication SCH ML: at 21:00

## 2018-03-11 RX ADMIN — INSULIN ASPART SCH: 100 INJECTION, SOLUTION INTRAVENOUS; SUBCUTANEOUS at 21:00

## 2018-03-11 RX ADMIN — HEPARIN SODIUM SCH MLS/HR: 10000 INJECTION, SOLUTION INTRAVENOUS; SUBCUTANEOUS at 12:38

## 2018-03-11 RX ADMIN — Medication SCH ML: at 09:00

## 2018-03-11 NOTE — HHI.DCPOC
Discharge Care Plan


Diagnosis:  


(1) Rhabdomyolysis


(2) Osteoarthritis of hips, bilateral


(3) Fall


Goals to Promote Your Health


* To prevent worsening of your condition and complications


* To maintain your health at the optimal level


Directions to Meet Your Goals


*** Take your medications as prescribed


*** Follow your dietary instruction


*** Follow activity as directed








*** Keep your appointments as scheduled


*** Take your immunizations and boosters as scheduled


*** If your symptoms worsen call your PCP, if no PCP go to Urgent Care Center 

or Emergency Room***


*** Smoking is Dangerous to Your Health. Avoid second hand smoke***


***Call the 24-hour hour crisis hotline for domestic abuse at 1-803.662.5575***











Katiuska Harrison PA-C Mar 11, 2018 14:46

## 2018-03-11 NOTE — HHI.PR
Subjective


Remarks


Follow-up rhabdomyolysis.  Patient doing better still weak in her proximal 

muscles needing help when she gets up discussed with nursing and physical 

therapy.





Objective


Vitals





Vital Signs








  Date Time  Temp Pulse Resp B/P (MAP) Pulse Ox O2 Delivery O2 Flow Rate FiO2


 


3/11/18 05:10 96.8 73 18 119/79 (92) 95   


 


3/11/18 01:11 97.5 74 16 118/71 (87) 97   


 


3/10/18 20:15 97.7 82 16 138/82 (100) 98   


 


3/10/18 15:45      Room Air  


 


3/10/18 14:55 98.0 76 16 117/75 (89) 95   


 


3/10/18 14:18        


 


3/10/18 12:54   18     


 


3/10/18 12:09    138/78 (98)    


 


3/10/18 07:14  83 18  99 Room Air  














I/O      


 


 3/10/18 3/10/18 3/10/18 3/11/18 3/11/18 3/11/18





 07:00 15:00 23:00 07:00 15:00 23:00


 


Intake Total   1000 ml   


 


Balance   1000 ml   


 


      


 


Intake IV Total   1000 ml   








Result Diagram:  


3/10/18 0855                                                                   

             3/10/18 0855





Imaging





Last Impressions








Thoracic Spine MRI 3/10/18 0524 Signed





Impressions: 





 Service Date/Time:  Saturday, March 10, 2018 06:18 - CONCLUSION:  1. Mild 





 degenerative disease T6-T7 2. No evidence of significant disc protrusion, 

spinal 





 stenosis, bony abnormality or spinal cord abnormalities.     Paul Valdivia MD 


 


Lumbar Spine MRI 3/10/18 0524 Signed





Impressions: 





 Service Date/Time:  Saturday, March 10, 2018 06:18 - CONCLUSION:  Normal 





 examination.       Paul Valdivia MD 


 


Hip and Pelvis X-Ray 3/10/18 0440 Signed





Impressions: 





 Service Date/Time:  Saturday, March 10, 2018 04:58 - CONCLUSION:  1. Intact 





 pelvis and left hip. 2. Mild bilateral hip osteoarthritis. 3. Moderate 





 osteoarthritis of the pubic symphysis.     Andrzej Lowe MD 


 


Hand X-Ray 3/10/18 0440 Signed





Impressions: 





 Service Date/Time:  Saturday, March 10, 2018 05:01 - CONCLUSION: Intact left 





 hand.     Andrzej Lowe MD 








Objective Remarks


GENERAL: Well-nourished, well-developed middle aged female patient in NAD.


SKIN: Warm and dry. No rash.


CARDIOVASCULAR: Regular rate and rhythm.  S1, S2 noted. No murmur appreciated. 


RESPIRATORY: No accessory muscle use. Clear to auscultation. Breath sounds 

equal bilaterally.  


GASTROINTESTINAL: Abdomen soft, non-tender, nondistended. Normoactive bowel 

sounds x4.


MUSCULOSKELETAL: No obvious deformities. Extremities without clubbing, cyanosis

, or edema. C-T-L spine nontender to palpation, no bony point or paraspinous 

muscle tenderness. 


NEUROLOGICAL: Awake and alert. No obvious cranial nerve deficits.  Motor 

grossly within normal limits. 5/5 muscle strength in bilateral upper and lower 

extremities, however with generalized weakness, difficulty sitting herself up 

in bed.  Normal speech.


PSYCHIATRIC: Appropriate mood and affect; insight and judgment normal.


Procedures


None





A/P


Problem List:  


(1) Rhabdomyolysis


ICD Code:  M62.82 - Rhabdomyolysis


Status:  Acute


(2) Fall


ICD Code:  W19.XXXA - Unspecified fall, initial encounter


Status:  Acute


Assessment and Plan


58-year-old female with HTN, HLD, DM, presents with 1 year history of worsening 

low back pain, leg weakness, and recent fall. 





Bilateral proximal lower Extremity Weakness and Fall: unclear etiology, rule 

out myositis.  


   -T-spine MRI reviewed, shows mild degenerative disease T6-7; no other acute 

abnormalities


   -L-spine MRI reviewed, normal


   -Hip/Pelvis xray reviewed, show mild bilateral osteoarthritis and moderate 

osteoarthritis of pubic symphysis


   -Unremarkable ESR, CRP, TSH, B12.  Consider neurology consult


   -Consult PT, recommended outpatient physical therapy


   -Fall precautions





Rhabdomyolysis: CPK 57843. Suspect secondary to recent fall.  This is improving 

but still significantly elevated almost 9000


   -Continue IVF with NS at 100cc/hr


   -Monitor CPK and renal function





Transaminitis: patient with elevated LFTs, no previous labs to compare. Suspect 

secondary to patient's daily alcohol use and rhabdomyolysis.  A symptomatic


   -avoid hepatotoxins


   -repeat LFTs


   -counseled on alcohol cessation


   -outpatient f/up





Hyperlipidemia/Hypertension: BP currently well controlled.


   -Hold Lipitor secondary to transaminitis.  Restart Norvasc





Diabetes Mellitus: 


   -Monitor accu-checks and cover with SSI.  Hold hypoglycemics for now





Vitamin D deficiency.  Start vitamin D 1000 IU daily repeat level in 3 months 





DVT Prophylaxis: Heparin sq


Discharge Planning


Possible discharge in the morning pending improvement of CPK levels





Problem Qualifiers





(1) Rhabdomyolysis:  


Qualified Codes:  T79.6XXA - Traumatic ischemia of muscle, initial encounter


(2) Fall:  


Qualified Codes:  W19.XXXA - Unspecified fall, initial encounter








Tom Bethea MD Mar 11, 2018 06:42

## 2018-03-12 VITALS
HEART RATE: 80 BPM | RESPIRATION RATE: 18 BRPM | SYSTOLIC BLOOD PRESSURE: 148 MMHG | OXYGEN SATURATION: 98 % | DIASTOLIC BLOOD PRESSURE: 68 MMHG | TEMPERATURE: 98.8 F

## 2018-03-12 VITALS
OXYGEN SATURATION: 95 % | TEMPERATURE: 97.9 F | DIASTOLIC BLOOD PRESSURE: 77 MMHG | SYSTOLIC BLOOD PRESSURE: 111 MMHG | RESPIRATION RATE: 20 BRPM | HEART RATE: 79 BPM

## 2018-03-12 VITALS
OXYGEN SATURATION: 97 % | RESPIRATION RATE: 16 BRPM | TEMPERATURE: 98.2 F | DIASTOLIC BLOOD PRESSURE: 69 MMHG | HEART RATE: 80 BPM | SYSTOLIC BLOOD PRESSURE: 116 MMHG

## 2018-03-12 VITALS
SYSTOLIC BLOOD PRESSURE: 107 MMHG | HEART RATE: 74 BPM | TEMPERATURE: 98.1 F | OXYGEN SATURATION: 97 % | DIASTOLIC BLOOD PRESSURE: 62 MMHG | RESPIRATION RATE: 12 BRPM

## 2018-03-12 VITALS
TEMPERATURE: 98.3 F | DIASTOLIC BLOOD PRESSURE: 74 MMHG | HEART RATE: 72 BPM | RESPIRATION RATE: 16 BRPM | SYSTOLIC BLOOD PRESSURE: 121 MMHG | OXYGEN SATURATION: 98 %

## 2018-03-12 VITALS — HEART RATE: 80 BPM

## 2018-03-12 LAB
BUN SERPL-MCNC: 5 MG/DL (ref 7–18)
CALCIUM SERPL-MCNC: 9.1 MG/DL (ref 8.5–10.1)
CHLORIDE SERPL-SCNC: 105 MEQ/L (ref 98–107)
CREAT SERPL-MCNC: 0.41 MG/DL (ref 0.5–1)
GFR SERPLBLD BASED ON 1.73 SQ M-ARVRAT: 193 ML/MIN (ref 89–?)
GLUCOSE SERPL-MCNC: 130 MG/DL (ref 74–106)
HCO3 BLD-SCNC: 28.8 MEQ/L (ref 21–32)
MAGNESIUM SERPL-MCNC: 1.7 MG/DL (ref 1.5–2.5)
SODIUM SERPL-SCNC: 141 MEQ/L (ref 136–145)

## 2018-03-12 RX ADMIN — Medication SCH ML: at 09:23

## 2018-03-12 RX ADMIN — INSULIN ASPART SCH: 100 INJECTION, SOLUTION INTRAVENOUS; SUBCUTANEOUS at 21:00

## 2018-03-12 RX ADMIN — STANDARDIZED SENNA CONCENTRATE AND DOCUSATE SODIUM SCH TAB: 8.6; 5 TABLET, FILM COATED ORAL at 09:00

## 2018-03-12 RX ADMIN — VITAMIN D, TAB 1000IU (100/BT) SCH UNITS: 25 TAB at 09:24

## 2018-03-12 RX ADMIN — INSULIN ASPART SCH: 100 INJECTION, SOLUTION INTRAVENOUS; SUBCUTANEOUS at 08:00

## 2018-03-12 RX ADMIN — STANDARDIZED SENNA CONCENTRATE AND DOCUSATE SODIUM SCH TAB: 8.6; 5 TABLET, FILM COATED ORAL at 09:24

## 2018-03-12 RX ADMIN — HEPARIN SODIUM SCH UNITS: 10000 INJECTION, SOLUTION INTRAVENOUS; SUBCUTANEOUS at 05:11

## 2018-03-12 RX ADMIN — FOLIC ACID SCH MG: 1 TABLET ORAL at 09:24

## 2018-03-12 RX ADMIN — HEPARIN SODIUM SCH MLS/HR: 10000 INJECTION, SOLUTION INTRAVENOUS; SUBCUTANEOUS at 10:24

## 2018-03-12 RX ADMIN — HEPARIN SODIUM SCH MLS/HR: 10000 INJECTION, SOLUTION INTRAVENOUS; SUBCUTANEOUS at 23:21

## 2018-03-12 RX ADMIN — FOLIC ACID SCH MLS/HR: 5 INJECTION, SOLUTION INTRAMUSCULAR; INTRAVENOUS; SUBCUTANEOUS at 05:11

## 2018-03-12 RX ADMIN — STANDARDIZED SENNA CONCENTRATE AND DOCUSATE SODIUM SCH TAB: 8.6; 5 TABLET, FILM COATED ORAL at 22:03

## 2018-03-12 RX ADMIN — HEPARIN SODIUM SCH UNITS: 10000 INJECTION, SOLUTION INTRAVENOUS; SUBCUTANEOUS at 14:45

## 2018-03-12 RX ADMIN — HEPARIN SODIUM SCH MLS/HR: 10000 INJECTION, SOLUTION INTRAVENOUS; SUBCUTANEOUS at 05:10

## 2018-03-12 RX ADMIN — Medication SCH ML: at 21:00

## 2018-03-12 RX ADMIN — MULTIPLE VITAMINS W/ MINERALS TAB SCH TAB: TAB at 09:24

## 2018-03-12 RX ADMIN — INSULIN ASPART SCH: 100 INJECTION, SOLUTION INTRAVENOUS; SUBCUTANEOUS at 17:00

## 2018-03-12 RX ADMIN — HEPARIN SODIUM SCH MLS/HR: 10000 INJECTION, SOLUTION INTRAVENOUS; SUBCUTANEOUS at 15:50

## 2018-03-12 RX ADMIN — INSULIN ASPART SCH: 100 INJECTION, SOLUTION INTRAVENOUS; SUBCUTANEOUS at 12:30

## 2018-03-12 RX ADMIN — FOLIC ACID SCH MLS/HR: 5 INJECTION, SOLUTION INTRAMUSCULAR; INTRAVENOUS; SUBCUTANEOUS at 14:47

## 2018-03-12 RX ADMIN — CYANOCOBALAMIN SCH MCG: 1000 INJECTION, SOLUTION INTRAMUSCULAR at 14:44

## 2018-03-12 NOTE — MB
cc:

Carol Denton MD

****

 

 

DATE OF CONSULT:

03/12/2018

 

YOB: 1960

 

REASON FOR CONSULTATION:

Proximal muscle weakness.

 

HISTORY OF PRESENT ILLNESS:

This is a 58-year-old woman with a history of hypertension, 

hyperlipidemia., diabetes, has been having some back pain and leg 

weakness for quite a few months now.   She works at a kitchen at a 

school and lifts heavy boxes and just contributed to having to lift 

older boxes at her age but she also had issues with trying to get up 

on a chair to get something from the closet.  She could put her leg up

on the chair but she could not lift herself over it.  She has trouble 

getting in and out of a vehicle, climbing steps.  She was yesterday at

[employer] with her fiance and she tried to go up one step, held on and 

she fell forward, could not get up.  Sometime ago at work she also had

an issues where she was on the floor and could not get up.  She was 

trying to pick something up off of the floor.

 

She is followed up by her primary care, Dr. Oh, and has done x-rays

and was told she has osteoarthritis of the hips.  Does have some 

chronic back pain but her MRI of her lumbar and thoracic spine does 

not confirm any spinal stenosis or cord compression.  She has no 

incontinence.

 

PAST MEDICAL HISTORY:

She has a history of -

1. Hyperlipidemia.

2.  Hypertension.

3.  Diabetes.

 

FAMILY HISTORY:

She is adopted but her birth mother is diabetic and has COPD.

 

ALLERGIES:

None reported.

 

HOME MEDICINES:

She is on a statin, Lipitor 20 mg.  She has been on this for a few 

years, she states.  Please refer to her med list.

 

SOCIAL HISTORY:

She admits to a few drinks of beer per day and occasional cocktails.  

Denies tobacco or illicit drugs.

 

PHYSICAL EXAMINATION:

VITAL SIGNS:   Temperature 97.9, pulse 79, respiratory rate 20, blood 

pressure 111/77.

NECK:   Supple.  No bruits.

HEART:  Regular.

NEUROLOGICAL EXAMINATION:  She is awake, alert, oriented and fluent.  

Pupils reactive.  Face symmetrical.  Tongue in midline.  Visual fields

are normal.

Motor:  Upper extremity:  She has fairly good strength, at least 4+/5-

proximally as well as distally.  Reflexes are 2+; they are not brisk. 

There is no Gutierrez sign.  Lower extremity strength:  She is at best 

4/5 proximally as well as distally.  DTRs are 1+.  Toes - withdraws

Sensory is really unremarkable.

Gait is withheld at this time.  She is sitting at the edge of the bed.

Cerebellar is normal.

 

LABORATORY DATA:

Her sed rate is 20.

CBC is really unremarkable.

Chemistries are reviewed.  CRP is 0.29.  Her sed rate was 20, as 

stated.

Vitamin D is low at 14.4.

TSH is 1330.

Albumin 2.9.

CPK on admission was 11,593, now is 7545.

, down to 100.   , down to 122.

GFR normal, glucose 130.

Urine unremarkable.

 

IMAGING:

As stated, lumbar unremarkable.

 

Thoracic:  Some mild degenerative changes T6, T7.

 

Hand x-ray:  Unremarkable.

 

Hip, pelvis x-ray:  Intact mild bilateral hip osteoarthritis and 

moderate osteoarthritis of the pubic symphysis.

 

IMPRESSION AND PLAN:

Muscle weakness.  I do not think this is a polymyalgia rheumatica.  

Her imaging was unremarkable.  Sed rate and CRP were really 

unremarkable.

 

Her B12 is a little low; I would supplement that and I would start her

on vitamin D.  Her vitamin D level is low.  I do not believe 1000 

units would be enough; I would put her on a higher dose and repeat it 

in one month.  Continue to hydrate her.

 

CPK  is trending down.  Avoid hepatotoxins.  I would stop her statin 

all together.  Stop alcohol.  Have physical therapy see her.  She will

need outpatient PT.  If her symptoms do not improve off the statin and

hydrating, then she would probably need as an outpatient a muscle 

biopsy and/or a nerve conduction study.   Continue current 

recommendations.

 

 

 

 

__________________________________

MD SHE Rojo/BERTO

D: 03/12/2018, 01:07 PM

T: 03/12/2018, 01:33 PM

Visit #: [id number]

Job #: [id number]

## 2018-03-12 NOTE — HHI.PR
Subjective


Remarks


in no acute distress.


still with some proximal muscle weakness.


no other complaints.





Objective


Vitals





Vital Signs








  Date Time  Temp Pulse Resp B/P (MAP) Pulse Ox O2 Delivery O2 Flow Rate FiO2


 


3/12/18 07:41 97.9 79 20 111/77 (88) 95   


 


3/12/18 03:19 98.1 74 12 107/62 (77) 97   


 


3/12/18 00:27 98.8 80 18 148/68 (94) 98   


 


3/11/18 20:30 99.2 86 18 113/67 (82) 99   


 


3/11/18 20:00     95   


 


3/11/18 15:00  75      


 


3/11/18 14:53 98.6 86 20 123/76 (92) 100   


 


3/11/18 11:18 98.0 78 20 112/77 (89) 97   














I/O      


 


 3/11/18 3/11/18 3/11/18 3/12/18 3/12/18 3/12/18





 07:00 15:00 23:00 07:00 15:00 23:00


 


Intake Total   2750 ml   


 


Balance   2750 ml   


 


      


 


Intake Oral   1500 ml   


 


IV Total   1250 ml   


 


# Voids   6   


 


# Bowel Movements   1   








Result Diagram:  


3/10/18 0855                                                                   

             3/12/18 0559





Imaging





Last Impressions








Thoracic Spine MRI 3/10/18 0524 Signed





Impressions: 





 Service Date/Time:  Saturday, March 10, 2018 06:18 - CONCLUSION:  1. Mild 





 degenerative disease T6-T7 2. No evidence of significant disc protrusion, 

spinal 





 stenosis, bony abnormality or spinal cord abnormalities.     Paul Valdivia MD 


 


Lumbar Spine MRI 3/10/18 0524 Signed





Impressions: 





 Service Date/Time:  Saturday, March 10, 2018 06:18 - CONCLUSION:  Normal 





 examination.       Paul Valdivia MD 


 


Hip and Pelvis X-Ray 3/10/18 0440 Signed





Impressions: 





 Service Date/Time:  Saturday, March 10, 2018 04:58 - CONCLUSION:  1. Intact 





 pelvis and left hip. 2. Mild bilateral hip osteoarthritis. 3. Moderate 





 osteoarthritis of the pubic symphysis.     Andrzej Lowe MD 


 


Hand X-Ray 3/10/18 1830 Signed





Impressions: 





 Service Date/Time:  Saturday, March 10, 2018 05:01 - CONCLUSION: Intact left 





 hand.     Andrzej Lowe MD 








Objective Remarks


GENERAL: This is a well-nourished, well-developed patient, in no apparent 

distress.


CARDIOVASCULAR: Regular rate and regular rhythm without murmurs, gallops, or 

rubs. 


RESPIRATORY: Clear to auscultation. Breath sounds equal bilaterally. No wheezes

, rales, or rhonchi.  


GASTROINTESTINAL: Abdomen soft, non-tender, nondistended. 


Normal, active bowel sounds


MUSCULOSKELETAL: Extremities without clubbing, cyanosis, or edema.


NEURO:  Alert & Oriented x4 to person, place, time, situation.  Moves all ext x4


Procedures


None


Medications and IVs





Inpatient Medications


Amlodipine Besylate (Norvasc) 2.5 mg DAILY PO  Last administered on 3/11/18at 09

:24;  Start 3/11/18 at 09:00


Bisacodyl (Dulcolax Supp) 10 mg DAILY  PRN RECTAL SEVERE CONSITIPATION;  Start 3

/10/18 at 13:00


Cholecalciferol (Vitamin D3) 1,000 units DAILY PO  Last administered on 3/11/

18at 09:24;  Start 3/11/18 at 09:00


Dextrose (D50w (Vial) Inj) 50 ml UNSCH  PRN IV PUSH HYPOGLYCEMIA-SEE COMMENTS;  

Start 3/10/18 at 13:00


Flumazenil (Romazicon Inj) 0.2 mg Q1M  PRN IV PUSH SEE LABEL COMMENTS;  Start 3/

10/18 at 16:30


Folic Acid (Folate) 1 mg DAILY PO  Last administered on 3/11/18at 09:24;  Start 

3/11/18 at 09:00;  Stop 3/16/18 at 08:59


Glucagon (Glucagon Inj) 1 mg UNSCH  PRN OTHER HYPOGLYCEMIA-SEE COMMENTS;  Start 

3/10/18 at 13:00


Haloperidol Lactate (Haldol Inj) 2 mg Q15M  PRN IM SEE LABEL COMMENTS;  Start 3/

10/18 at 16:30


Heparin Sodium (Porcine) (Heparin Inj) 5,000 units Q12H SQ  Last administered 

on 3/12/18at 05:11;  Start 3/10/18 at 13:00


Insulin Aspart (NovoLOG SUPPLEMENTAL SCALE) 1 ACHS SLIDING  SCALE SQ  Last 

administered on 3/11/18at 17:11;  Start 3/10/18 at 17:00


Lactulose (Lactulose Liq) 30 ml DAILY  PRN PO SEVERE CONSITIPATION;  Start 3/10/

18 at 13:00


Lorazepam (Ativan Inj) 2 mg Q15M  PRN IV PUSH CIWA > 20;  Start 3/10/18 at 16:30


Lorazepam (Ativan) 2 mg Q2H  PRN PO CIWA 11-14;  Start 3/10/18 at 16:30


Magnesium Hydroxide (Milk Of Magnesia Liq) 30 ml Q12H  PRN PO Mild constipation

;  Start 3/10/18 at 13:00


Morphine Sulfate (Morphine Inj) 1 mg Q3H  PRN IV PUSH BREAKTHROUGH PAIN;  Start 

3/10/18 at 13:00


Multivitamins/ Minerals Therapeutic (Theragran M Tab) 1 tab DAILY PO  Last 

administered on 3/11/18at 09:24;  Start 3/11/18 at 09:00;  Stop 3/16/18 at 08:59


Naloxone HCl (Narcan Inj) 0.4 mg UNSCH  PRN IV PUSH SEE LABEL COMMENTS;  Start 3

/10/18 at 13:00


Ondansetron HCl (Zofran Inj) 4 mg Q6H  PRN IVP NAUSEA OR VOMITING;  Start 3/10/

18 at 13:00


Potassium Chloride (KCl) 20 meq ONCE  ONCE PO  Last administered on 3/11/18at 08

:45;  Start 3/11/18 at 08:45;  Stop 3/11/18 at 08:52;  Status DC


Senna/Docusate Sodium (Jolie-Colace) 1 tab BID PO  Last administered on 3/11/

18at 09:25;  Start 3/10/18 at 21:00


Sennosides (Senokot) 17.2 mg Q12H  PRN PO Moderate constipation;  Start 3/10/18 

at 13:00


Sodium Bicarbonate 100 meq/Potassium Chloride 20 meq/ Sodium Chloride 1,110 ml 

@  100 mls/hr Q11H6M IV  Last administered on 3/12/18at 05:10;  Start 3/10/18 

at 14:00


Sodium Chloride (NS Flush) 2 ml BID IV FLUSH  Last administered on 3/11/18at 09:

00;  Start 3/10/18 at 21:00


Thiamine HCl (Vitamin B1) 100 mg DAILY PO ;  Start 3/17/18 at 09:00


Thiamine HCl 500 mg/Sodium Chloride 255 ml @  62.5 mls/hr Q24H IV ;  Start 3/13/

18 at 09:00;  Stop 3/17/18 at 08:59


Tramadol HCl (Ultram) 100 mg Q4H  PRN PO PAIN SCALE 6 TO 10;  Start 3/10/18 at 

13:00





A/P


Problem List:  


(1) Rhabdomyolysis


ICD Code:  M62.82 - Rhabdomyolysis


Status:  Acute


(2) Fall


ICD Code:  W19.XXXA - Unspecified fall, initial encounter


Status:  Acute


Assessment and Plan


Bilateral proximal lower Extremity Weakness and Fall: unclear etiology.


   -T-spine MRI reviewed, shows mild degenerative disease T6-7; no other acute 

abnormalities


   -L-spine MRI reviewed, normal


   -Hip/Pelvis xray reviewed, show mild bilateral osteoarthritis and moderate 

osteoarthritis of pubic symphysis


   -Unremarkable ESR, CRP, TSH, B12.  Consider neurology consult


   -Consult PT, recommended outpatient physical therapy


   -consult neurology


   -Fall precautions





Rhabdomyolysis: CPK 24128. Suspect secondary to recent fall.  This is improving 

but still significantly elevated .


   -Continue IVF with NS at 100cc/hr


   -Monitor CPK and renal function





Transaminitis: patient with elevated LFTs, no previous labs to compare. Suspect 

secondary to patient's daily alcohol use and rhabdomyolysis.  A symptomatic


   -avoid hepatotoxins


   -repeat LFTs


   -counseled on alcohol cessation


   -outpatient f/up





Hyperlipidemia/Hypertension: BP currently well controlled.


   -Hold Lipitor secondary to transaminitis.  Restart Norvasc





Diabetes Mellitus: 


   -Monitor accu-checks and cover with SSI.  Hold hypoglycemics for now





Vitamin D deficiency.  Start vitamin D 1000 IU daily repeat level in 3 months 





DVT Prophylaxis: Heparin sq


Discharge Planning


awaiting neurology evaluation and further improvement in  her CPK level.





Problem Qualifiers





(1) Rhabdomyolysis:  


Qualified Codes:  T79.6XXA - Traumatic ischemia of muscle, initial encounter


(2) Fall:  


Qualified Codes:  W19.XXXA - Unspecified fall, initial encounter








Jose Perez MD Mar 12, 2018 08:07

## 2018-03-13 VITALS
HEART RATE: 77 BPM | RESPIRATION RATE: 16 BRPM | TEMPERATURE: 98.1 F | DIASTOLIC BLOOD PRESSURE: 75 MMHG | SYSTOLIC BLOOD PRESSURE: 120 MMHG | OXYGEN SATURATION: 97 %

## 2018-03-13 VITALS
DIASTOLIC BLOOD PRESSURE: 63 MMHG | SYSTOLIC BLOOD PRESSURE: 120 MMHG | RESPIRATION RATE: 16 BRPM | TEMPERATURE: 98.3 F | OXYGEN SATURATION: 98 % | HEART RATE: 77 BPM

## 2018-03-13 VITALS
RESPIRATION RATE: 16 BRPM | TEMPERATURE: 98.1 F | SYSTOLIC BLOOD PRESSURE: 119 MMHG | OXYGEN SATURATION: 95 % | DIASTOLIC BLOOD PRESSURE: 70 MMHG | HEART RATE: 71 BPM

## 2018-03-13 VITALS — HEART RATE: 70 BPM

## 2018-03-13 VITALS — HEART RATE: 69 BPM

## 2018-03-13 LAB
BUN SERPL-MCNC: 6 MG/DL (ref 7–18)
CALCIUM SERPL-MCNC: 9.3 MG/DL (ref 8.5–10.1)
CHLORIDE SERPL-SCNC: 103 MEQ/L (ref 98–107)
CREAT SERPL-MCNC: 0.47 MG/DL (ref 0.5–1)
GFR SERPLBLD BASED ON 1.73 SQ M-ARVRAT: 165 ML/MIN (ref 89–?)
GLUCOSE SERPL-MCNC: 122 MG/DL (ref 74–106)
HCO3 BLD-SCNC: 30.1 MEQ/L (ref 21–32)
SODIUM SERPL-SCNC: 142 MEQ/L (ref 136–145)

## 2018-03-13 RX ADMIN — INSULIN ASPART SCH: 100 INJECTION, SOLUTION INTRAVENOUS; SUBCUTANEOUS at 08:00

## 2018-03-13 RX ADMIN — VITAMIN D, TAB 1000IU (100/BT) SCH UNITS: 25 TAB at 09:28

## 2018-03-13 RX ADMIN — MULTIPLE VITAMINS W/ MINERALS TAB SCH TAB: TAB at 09:27

## 2018-03-13 RX ADMIN — CYANOCOBALAMIN SCH MCG: 1000 INJECTION, SOLUTION INTRAMUSCULAR at 09:28

## 2018-03-13 RX ADMIN — Medication SCH ML: at 09:28

## 2018-03-13 RX ADMIN — HEPARIN SODIUM SCH UNITS: 10000 INJECTION, SOLUTION INTRAVENOUS; SUBCUTANEOUS at 02:31

## 2018-03-13 RX ADMIN — STANDARDIZED SENNA CONCENTRATE AND DOCUSATE SODIUM SCH TAB: 8.6; 5 TABLET, FILM COATED ORAL at 09:28

## 2018-03-13 RX ADMIN — FOLIC ACID SCH MG: 1 TABLET ORAL at 09:28

## 2018-03-13 NOTE — HHI.PR
Addendum To [facility] Progress Not


Reason for addendum:  Additonal documentation (BMP and CPK is resulted. CPK 

elevated. however the patient is clinically doing much better with improvement 

in her weakness. advised the patient to stay one more day for further IV 

hydration. but the patient wants to go home. advised to increaes her po fluid 

intake. script for repeated renal function and CPK was given to the patient to 

be done within the next 3-4 days with f/u with PCP.)











Jose Perez MD Mar 13, 2018 12:23

## 2018-03-13 NOTE — HHI.PR
Subjective


Remarks


in no acute distress.


denies pain.


overall improving.


no new complaints.


d/w the RN.





Objective


Vitals





Vital Signs








  Date Time  Temp Pulse Resp B/P (MAP) Pulse Ox O2 Delivery O2 Flow Rate FiO2


 


3/13/18 08:00 98.1 77 16 120/75 (90) 97   


 


3/13/18 04:46  69      


 


3/13/18 03:30 98.1 71 16 119/70 (86) 95   


 


3/13/18 00:01  70      


 


3/12/18 23:33 98.3 72 16 121/74 (90) 98   


 


3/12/18 20:22 98.2 80 16 116/69 (85) 97   


 


3/12/18 20:12  80      








Result Diagram:  


3/10/18 0855                                                                   

             3/12/18 0559





Imaging





Last Impressions








Thoracic Spine MRI 3/10/18 0524 Signed





Impressions: 





 Service Date/Time:  Saturday, March 10, 2018 06:18 - CONCLUSION:  1. Mild 





 degenerative disease T6-T7 2. No evidence of significant disc protrusion, 

spinal 





 stenosis, bony abnormality or spinal cord abnormalities.     Paul Valdivia MD 


 


Lumbar Spine MRI 3/10/18 0524 Signed





Impressions: 





 Service Date/Time:  Saturday, March 10, 2018 06:18 - CONCLUSION:  Normal 





 examination.       Paul Valdivia MD 


 


Hip and Pelvis X-Ray 3/10/18 0440 Signed





Impressions: 





 Service Date/Time:  Saturday, March 10, 2018 04:58 - CONCLUSION:  1. Intact 





 pelvis and left hip. 2. Mild bilateral hip osteoarthritis. 3. Moderate 





 osteoarthritis of the pubic symphysis.     Andrzej Lowe MD 


 


Hand X-Ray 3/10/18 0440 Signed





Impressions: 





 Service Date/Time:  Saturday, March 10, 2018 05:01 - CONCLUSION: Intact left 





 hand.     Andrzej Lowe MD 








Objective Remarks


GENERAL: This is a well-nourished, well-developed patient, in no apparent 

distress.


CARDIOVASCULAR: Regular rate and regular rhythm without murmurs, gallops, or 

rubs. 


RESPIRATORY: Clear to auscultation. Breath sounds equal bilaterally. No wheezes

, rales, or rhonchi.  


GASTROINTESTINAL: Abdomen soft, non-tender, nondistended. 


Normal, active bowel sounds


MUSCULOSKELETAL: Extremities without clubbing, cyanosis, or edema.


NEURO:  Alert & Oriented x4 to person, place, time, situation.  Moves all ext x4


Procedures


None


Medications and IVs





Inpatient Medications


Amlodipine Besylate (Norvasc) 2.5 mg DAILY PO  Last administered on 3/12/18at 09

:25;  Start 3/11/18 at 09:00


Bisacodyl (Dulcolax Supp) 10 mg DAILY  PRN RECTAL SEVERE CONSITIPATION;  Start 3

/10/18 at 13:00


Cholecalciferol (Vitamin D3) 1,000 units DAILY PO  Last administered on 3/12/

18at 09:24;  Start 3/11/18 at 09:00


Cyanocobalamin (Vitamin B12 Inj) 1,000 mcg DAILY IM  Last administered on 3/12/

18at 14:44;  Start 3/12/18 at 14:00


Dextrose (D50w (Vial) Inj) 50 ml UNSCH  PRN IV PUSH HYPOGLYCEMIA-SEE COMMENTS;  

Start 3/10/18 at 13:00


Flumazenil (Romazicon Inj) 0.2 mg Q1M  PRN IV PUSH SEE LABEL COMMENTS;  Start 3/

10/18 at 16:30


Folic Acid (Folate) 1 mg DAILY PO  Last administered on 3/12/18at 09:24;  Start 

3/11/18 at 09:00;  Stop 3/16/18 at 08:59


Glucagon (Glucagon Inj) 1 mg UNSCH  PRN OTHER HYPOGLYCEMIA-SEE COMMENTS;  Start 

3/10/18 at 13:00


Haloperidol Lactate (Haldol Inj) 2 mg Q15M  PRN IM SEE LABEL COMMENTS;  Start 3/

10/18 at 16:30


Heparin Sodium (Porcine) (Heparin Inj) 5,000 units Q12H SQ  Last administered 

on 3/13/18at 02:31;  Start 3/10/18 at 13:00


Insulin Aspart (NovoLOG SUPPLEMENTAL SCALE) 1 ACHS SLIDING  SCALE SQ  Last 

administered on 3/11/18at 17:11;  Start 3/10/18 at 17:00


Lactulose (Lactulose Liq) 30 ml DAILY  PRN PO SEVERE CONSITIPATION;  Start 3/10/

18 at 13:00


Lorazepam (Ativan Inj) 2 mg Q15M  PRN IV PUSH CIWA > 20;  Start 3/10/18 at 16:30


Lorazepam (Ativan) 2 mg Q2H  PRN PO CIWA 11-14;  Start 3/10/18 at 16:30


Magnesium Hydroxide (Milk Of Magnesia Liq) 30 ml Q12H  PRN PO Mild constipation

;  Start 3/10/18 at 13:00


Morphine Sulfate (Morphine Inj) 1 mg Q3H  PRN IV PUSH BREAKTHROUGH PAIN;  Start 

3/10/18 at 13:00


Multivitamins/ Minerals Therapeutic (Theragran M Tab) 1 tab DAILY PO  Last 

administered on 3/12/18at 09:24;  Start 3/11/18 at 09:00;  Stop 3/16/18 at 08:59


Naloxone HCl (Narcan Inj) 0.4 mg UNSCH  PRN IV PUSH SEE LABEL COMMENTS;  Start 3

/10/18 at 13:00


Ondansetron HCl (Zofran Inj) 4 mg Q6H  PRN IVP NAUSEA OR VOMITING;  Start 3/10/

18 at 13:00


Potassium Chloride (KCl) 20 meq ONCE  ONCE PO  Last administered on 3/11/18at 08

:45;  Start 3/11/18 at 08:45;  Stop 3/11/18 at 08:52;  Status DC


Senna/Docusate Sodium (Jolie-Colace) 1 tab BID PO  Last administered on 3/12/

18at 22:03;  Start 3/10/18 at 21:00


Sennosides (Senokot) 17.2 mg Q12H  PRN PO Moderate constipation;  Start 3/10/18 

at 13:00


Sodium Bicarbonate 100 meq/Potassium Chloride 20 meq/ Sodium Chloride 1,110 ml 

@  100 mls/hr Q11H6M IV  Last administered on 3/12/18at 23:21;  Start 3/10/18 

at 14:00


Sodium Chloride (NS Flush) 2 ml BID IV FLUSH  Last administered on 3/11/18at 09:

00;  Start 3/10/18 at 21:00


Thiamine HCl (Vitamin B1) 100 mg DAILY PO ;  Start 3/17/18 at 09:00


Thiamine HCl 500 mg/Sodium Chloride 255 ml @  62.5 mls/hr Q24H IV ;  Start 3/13/

18 at 09:00;  Stop 3/17/18 at 08:59


Tramadol HCl (Ultram) 100 mg Q4H  PRN PO PAIN SCALE 6 TO 10;  Start 3/10/18 at 

13:00





A/P


Problem List:  


(1) Rhabdomyolysis


ICD Code:  M62.82 - Rhabdomyolysis


Status:  Acute


(2) Fall


ICD Code:  W19.XXXA - Unspecified fall, initial encounter


Status:  Acute


Assessment and Plan


Bilateral proximal lower Extremity Weakness and Fall: possibly due to statin


   -T-spine MRI reviewed, shows mild degenerative disease T6-7; no other acute 

abnormalities


   -L-spine MRI reviewed, normal


   -Hip/Pelvis xray reviewed, show mild bilateral osteoarthritis and moderate 

osteoarthritis of pubic symphysis


   -Unremarkable ESR, CRP, TSH, B12.  Consider neurology consult


   -Consulted PT, recommended outpatient physical therapy


   - neurology consult appreciated; recommended that statin to be stopped- 

possible muscle biopsy and nerve-conduction study as outpatient if no 

improvement.


   -Fall precautions





Rhabdomyolysis: CPK 81732. Suspect secondary to recent fall.  This is improving 

.


   -Continue IVF with NS at 100cc/hr


   -Monitor CPK and renal function





Transaminitis: patient with elevated LFTs, no previous labs to compare. Suspect 

secondary to patient's daily alcohol use and rhabdomyolysis.  A symptomatic


   -avoid hepatotoxins


   -counseled on alcohol cessation


   -outpatient f/up





Hyperlipidemia/Hypertension: BP currently well controlled.


   -Hold Lipitor secondary to transaminitis.  Restart Norvasc





Diabetes Mellitus: 


   -Monitor accu-checks and cover with SSI.  Hold hypoglycemics for now





Vitamin D deficiency.  continue with supplement.





DVT Prophylaxis: Heparin sq


Discharge Planning


dc home today - pending the repeated BMP and CPK level today.


see med list.


f/u; pcp and neurology.


d/w the patient and RN.





Problem Qualifiers





(1) Rhabdomyolysis:  


Qualified Codes:  T79.6XXA - Traumatic ischemia of muscle, initial encounter


(2) Fall:  


Qualified Codes:  W19.XXXA - Unspecified fall, initial encounter








Jose Perez MD Mar 13, 2018 08:20

## 2018-03-13 NOTE — HHI.DS
__________________________________________________





Discharge Summary


Admission Date


Mar 10, 2018 at 13:26


Discharge Date:  Mar 13, 2018


Admitting Diagnosis





rhabdomyolysis, rule out myositis





(1) Rhabdomyolysis


ICD Code:  M62.82 - Rhabdomyolysis


Diagnosis:  Principal


Status:  Acute


(2) Fall


ICD Code:  W19.XXXA - Unspecified fall, initial encounter


Diagnosis:  Principal


Status:  Acute


Procedures


None


Brief History - From Admission


Written by Katiuska Harrison, acting as scribe for Dr. Bethea on 3/10/18 at 14:

35. 





58-year-old female with HTN, HLD, DM, presents with 1 year history of worsening 

low back pain, leg weakness, and recent fall. The patient reports over the past 

year she's had low back and hip pains, with loss of strength of the bilateral 

legs. 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

, however lately has not been able to keep up with the work. A month ago she 

picked up a tissue off the floor, and had difficulty standing back up. She saw 

her PCP Dr. Oh as outpatient at that time, had xrays done, and was told she 

has osteoarthritis in bilateral hips. Over the past month she has been having 

trouble getting in and out of a car. She states she has to  her legs 

with her arms in order to transition in and out of the car. The patient states 

last night she went out to dinner with her fiance, had a few cocktails, then 

had a step up however her legs were unable to do so and they just gave out on 

her. She states she fell to ground onto her left hip and left hand. She reports 

continued diffuse low back pain, but mostly complains of bilateral leg 

weakness. She also reports noticing recent numbness down the left lateral leg. 

Denies any recent weight loss or specific joint pains.


CBC/BMP:  


3/10/18 0855                                                                   

             3/12/18 0559





Significant Findings





Laboratory Tests








Test


  3/10/18


08:55 3/10/18


09:00 3/11/18


06:10 3/12/18


05:59


 


Red Blood Count


  3.94 MIL/MM3


(4.00-5.30) 


  


  


 


 


Lymphocytes (%) (Auto)


  49.6 %


(9.0-44.0) 


  


  


 


 


Creatinine


  0.45 MG/DL


(0.50-1.00) 


  0.33 MG/DL


(0.50-1.00) 0.41 MG/DL


(0.50-1.00)


 


Random Glucose


  117 MG/DL


() 


  112 MG/DL


() 130 MG/DL


()


 


Aspartate Amino Transf


(AST/SGOT) 151 U/L


(15-37) 


  122 U/L


(15-37) 


 


 


Alanine Aminotransferase


(ALT/SGPT) 129 U/L


(10-53) 


  100 U/L


(10-53) 


 


 


Chloride Level


  108 MEQ/L


() 


  


  


 


 


Total Creatine Kinase


  33075 U/L


() 


  8817 U/L


() 7545 U/L


()


 


Creatine Kinase MB


  412.2 NG/ML


(0.5-3.6) 


  309.2 NG/ML


(0.5-3.6) 238.5 NG/ML


(0.5-3.6)


 


25-Hydroxy Vitamin D Total


  14.4 ng/ML


() 


  


  


 


 


Urine Bacteria


  


  OCC /hpf


(NONE) 


  


 


 


Urine Mucus  MOD /lpf (OCC)   


 


Blood Urea Nitrogen   6 MG/DL (7-18)  5 MG/DL (7-18) 


 


Total Protein


  


  


  6.1 GM/DL


(6.4-8.2) 


 


 


Albumin


  


  


  2.9 GM/DL


(3.4-5.0) 


 


 


Calcium Level


  


  


  8.3 MG/DL


(8.5-10.1) 


 








Imaging





Last Impressions








Thoracic Spine MRI 3/10/18 0524 Signed





Impressions: 





 Service Date/Time:  Saturday, March 10, 2018 06:18 - CONCLUSION:  1. Mild 





 degenerative disease T6-T7 2. No evidence of significant disc protrusion, 

spinal 





 stenosis, bony abnormality or spinal cord abnormalities.     Paul Valdivia MD 


 


Lumbar Spine MRI 3/10/18 0524 Signed





Impressions: 





 Service Date/Time:  Saturday, March 10, 2018 06:18 - CONCLUSION:  Normal 





 examination.       Paul Valdivia MD 


 


Hip and Pelvis X-Ray 3/10/18 2600 Signed





Impressions: 





 Service Date/Time:  Saturday, March 10, 2018 04:58 - CONCLUSION:  1. Intact 





 pelvis and left hip. 2. Mild bilateral hip osteoarthritis. 3. Moderate 





 osteoarthritis of the pubic symphysis.     Andrzej Lowe MD 


 


Hand X-Ray 3/10/18 0440 Signed





Impressions: 





 Service Date/Time:  Saturday, March 10, 2018 05:01 - CONCLUSION: Intact left 





 hand.     Andrzej Lowe MD 








PE at Discharge


GENERAL: This is a well-nourished, well-developed patient, in no apparent 

distress.


CARDIOVASCULAR: Regular rate and regular rhythm without murmurs, gallops, or 

rubs. 


RESPIRATORY: Clear to auscultation. Breath sounds equal bilaterally. No wheezes

, rales, or rhonchi.  


GASTROINTESTINAL: Abdomen soft, non-tender, nondistended. 


Normal, active bowel sounds


MUSCULOSKELETAL: Extremities without clubbing, cyanosis, or edema.


NEURO:  Alert & Oriented x4 to person, place, time, situation.  Moves all ext x4


Hospital Course


Bilateral proximal lower Extremity Weakness and Fall: possibly due to statin


   -T-spine MRI reviewed, shows mild degenerative disease T6-7; no other acute 

abnormalities


   -L-spine MRI reviewed, normal


   -Hip/Pelvis xray reviewed, show mild bilateral osteoarthritis and moderate 

osteoarthritis of pubic symphysis


   -Unremarkable ESR, CRP, TSH, B12.  Consider neurology consult


   -Consulted PT, recommended outpatient physical therapy


   - neurology consult appreciated; recommended that statin to be stopped- 

possible muscle biopsy and nerve-conduction study as outpatient if no 

improvement.


   -Fall precautions





Rhabdomyolysis: CPK 90577. Suspect secondary to recent fall.  This is improving 

.


   -Continue IVF with NS at 100cc/hr


   -Monitor CPK and renal function





Transaminitis: patient with elevated LFTs, no previous labs to compare. Suspect 

secondary to patient's daily alcohol use and rhabdomyolysis.  A symptomatic


   -avoid hepatotoxins


   -counseled on alcohol cessation


   -outpatient f/up





Hyperlipidemia/Hypertension: BP currently well controlled.


   -Hold Lipitor secondary to transaminitis.  Restart Norvasc





Diabetes Mellitus: 


   -Monitor accu-checks and cover with SSI.  Hold hypoglycemics for now





Vitamin D deficiency.  continue with supplement.





DVT Prophylaxis: Heparin sq


Pt Condition on Discharge:  Fair


Discharge Disposition:  Discharge Home


Discharge Time:  <= 30 minutes


Discharge Instructions


DIET: Follow Instructions for:  Heart Healthy Diet, Diabetic Diet


Activities you can perform:  Regular-No Restrictions











Jose Perez MD Mar 13, 2018 08:23